# Patient Record
Sex: MALE | Race: BLACK OR AFRICAN AMERICAN | NOT HISPANIC OR LATINO
[De-identification: names, ages, dates, MRNs, and addresses within clinical notes are randomized per-mention and may not be internally consistent; named-entity substitution may affect disease eponyms.]

---
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
## 2021-03-10 ENCOUNTER — TRANSCRIPTION ENCOUNTER (OUTPATIENT)
Age: 21
End: 2021-03-10

## 2021-03-15 ENCOUNTER — TRANSCRIPTION ENCOUNTER (OUTPATIENT)
Age: 21
End: 2021-03-15

## 2021-03-20 ENCOUNTER — EMERGENCY (EMERGENCY)
Facility: HOSPITAL | Age: 21
LOS: 0 days | Discharge: HOME | End: 2021-03-20
Payer: OTHER MISCELLANEOUS

## 2021-03-20 VITALS
RESPIRATION RATE: 19 BRPM | SYSTOLIC BLOOD PRESSURE: 125 MMHG | OXYGEN SATURATION: 98 % | DIASTOLIC BLOOD PRESSURE: 81 MMHG | TEMPERATURE: 98 F | WEIGHT: 115.08 LBS | HEART RATE: 100 BPM | HEIGHT: 63 IN

## 2021-03-20 DIAGNOSIS — S61.212A LACERATION WITHOUT FOREIGN BODY OF RIGHT MIDDLE FINGER WITHOUT DAMAGE TO NAIL, INITIAL ENCOUNTER: ICD-10-CM
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
## 2021-03-20 PROCEDURE — 12001 RPR S/N/AX/GEN/TRNK 2.5CM/<: CPT

## 2021-03-20 PROCEDURE — 99283 EMERGENCY DEPT VISIT LOW MDM: CPT | Mod: 25

## 2021-03-20 PROCEDURE — 73120 X-RAY EXAM OF HAND: CPT | Mod: 26,RT

## 2021-03-20 RX ORDER — LIDOCAINE HCL 20 MG/ML
5 VIAL (ML) INJECTION ONCE
Refills: 0 | Status: COMPLETED | OUTPATIENT
Start: 2021-03-20 | End: 2021-03-20

## 2021-03-20 RX ORDER — IBUPROFEN 200 MG
600 TABLET ORAL ONCE
Refills: 0 | Status: COMPLETED | OUTPATIENT
Start: 2021-03-20 | End: 2021-03-20

## 2021-03-20 RX ORDER — BACITRACIN ZINC 500 UNIT/G
1 OINTMENT IN PACKET (EA) TOPICAL ONCE
Refills: 0 | Status: COMPLETED | OUTPATIENT
Start: 2021-03-20 | End: 2021-03-20

## 2021-03-20 RX ORDER — IBUPROFEN 200 MG
2 TABLET ORAL
Qty: 42 | Refills: 0
Start: 2021-03-20 | End: 2021-03-26

## 2021-03-20 RX ADMIN — Medication 450 MILLIGRAM(S): at 21:08

## 2021-03-20 RX ADMIN — Medication 5 MILLILITER(S): at 23:06

## 2021-03-20 RX ADMIN — Medication 1 APPLICATION(S): at 23:06

## 2021-03-20 RX ADMIN — Medication 600 MILLIGRAM(S): at 21:08

## 2021-03-20 NOTE — ED PROVIDER NOTE - CLINICAL SUMMARY MEDICAL DECISION MAKING FREE TEXT BOX
19 y/o male with no PMhx no t on meds presented to the ED with complaint of laceration 3rd right finger x today    imp right middle finger laceration     plan   motrin   Dx finger   lac repair   bacitracin   wound clean with sterile water and betadine   clean gauze   lidocaine w/o epi   patient education   return to the ED in 3 days for wound check   return in 7 days for suture removal

## 2021-03-20 NOTE — ED PROVIDER NOTE - NSFOLLOWUPCLINICS_GEN_ALL_ED_FT
[facility] - Primary Care  Primary Care  [address] [place] [address]  Phone: [phone]  Fax:   Follow Up Time:

## 2021-03-20 NOTE — ED PROVIDER NOTE - NSFOLLOWUPINSTRUCTIONS_ED_ALL_ED_FT
please return to the ED in 3 days for wound check on 3/23/21  please return to the ED in 7 days for suture removal on 3/27/21  patient instructed to please schedule to follow up with 7 days with your primary care doctor for laceration of finger or schedule to see primary care doctor at     [facility] - Primary Care  Primary Care  [address] [place] [address]  Phone: [phone]    Please take motrin 400 mg with meals every 8 hours as needed for pain   please take clindamycin 450 mg 3 times daily for 7 days     please return to the ED for worst symptoms chest pain, short of breath, fever, chills, cough, hemoptysis, numbness, weakness, drooling, blurry vision, headache, dizziness, bleeding, dysuria, frequency, urgency, rash, lesion, fall, injury, trauma, paresthesia, swelling, pain, discoloration

## 2021-03-20 NOTE — ED PROVIDER NOTE - PATIENT PORTAL LINK FT
You can access the FollowMyHealth Patient Portal offered by [facility] by registering at the following website: [email or web address]. By joining [employer]’s FollowMyHealth portal, you will also be able to view your health information using other applications (apps) compatible with our system.

## 2021-03-20 NOTE — ED PROVIDER NOTE - MUSCULOSKELETAL MINIMAL EXAM
1 cm horizontal laceration right middle phalange, no signs of infection, ttp around the area, minimal bleeding, FROM, no sensory deficit, distal pulse intact, cap refill < 2 sec, strength 5/5

## 2021-03-20 NOTE — ED PROVIDER NOTE - OBJECTIVE STATEMENT
21 y/o male with no PMhx no t on meds presented to the ED with complaint of laceration 3rd right finger x today while washing a knife. admit ot intermittent throbbing pain no radiation, worst with movement denies taking motrin or tylenol. denies lost of motion, discharge, numbness, decrease sensory, discoloration. patient admit to having tetanus shot this year.

## 2021-03-20 NOTE — ED PROCEDURE NOTE - PROCEDURE ADDITIONAL DETAILS
motrin   clinda  bacitracin   clean gauze   return in 3 days for wound check   return in 7 days for suture removal

## 2021-03-20 NOTE — ED PROVIDER NOTE - PROGRESS NOTE DETAILS
AAOx 3 non toxic looking afebrile sitting in bed in NAD, breathing comfortably. pain meds given. pain controlled. clinda given for laceration. Dx right hand done no fx visualized. wound washed with sterile water and betadine, digital block with 1 % lido w/o epi, lac repair done with 4.0, 4 simple interrupted sutures placed, bacitracin applied, clean gauze applies. educated to keep wound clean to avoid infection. strict precaution to return to ED given

## 2021-09-05 NOTE — ED PROVIDER NOTE - NS ED MD DISPO DISCHARGE CCDA
AAOx4, pleasant. Resting in bed, finished AM dose of IV antibiotic. C/o 0/10 pain, declining intervention at this time. -N/V. -N/T. Denies new onset of chest pain/SOB. +BS in all 4 quadrants, last BM yesterday per pt. Room air, satting > 90%. POC discussed, denies further needs at this time. Call light within reach & hourly rounding in place.    Patient/Caregiver provided printed discharge information.

## 2023-12-24 ENCOUNTER — EMERGENCY (EMERGENCY)
Facility: HOSPITAL | Age: 23
LOS: 0 days | Discharge: HOME | End: 2023-12-24
Attending: EMERGENCY MEDICINE
Payer: COMMERCIAL

## 2023-12-24 VITALS
RESPIRATION RATE: 19 BRPM | DIASTOLIC BLOOD PRESSURE: 71 MMHG | HEART RATE: 87 BPM | OXYGEN SATURATION: 99 % | HEIGHT: 63 IN | TEMPERATURE: 98 F | SYSTOLIC BLOOD PRESSURE: 107 MMHG | WEIGHT: 113.1 LBS

## 2023-12-24 VITALS
HEART RATE: 72 BPM | DIASTOLIC BLOOD PRESSURE: 77 MMHG | TEMPERATURE: 98 F | RESPIRATION RATE: 18 BRPM | SYSTOLIC BLOOD PRESSURE: 118 MMHG | OXYGEN SATURATION: 98 %
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
## 2023-12-24 DIAGNOSIS — M79.652 PAIN IN LEFT THIGH: ICD-10-CM

## 2023-12-24 PROCEDURE — 99283 EMERGENCY DEPT VISIT LOW MDM: CPT
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
## 2023-12-24 NOTE — ED PROVIDER NOTE - PATIENT PORTAL LINK FT
You can access the FollowMyHealth Patient Portal offered by [facility] by registering at the following website: [email or web address]. By joining "[employer]"’s FollowMyHealth portal, you will also be able to view your health information using other applications (apps) compatible with our system. You can access the FollowMyHealth Patient Portal offered by [facility] by registering at the following website: [email or web address]. By joining [employer]’s FollowMyHealth portal, you will also be able to view your health information using other applications (apps) compatible with our system.

## 2023-12-24 NOTE — ED ADULT NURSE NOTE - NSFALLUNIVINTERV_ED_ALL_ED
Bed/Stretcher in lowest position, wheels locked, appropriate side rails in place/Call bell, personal items and telephone in reach/Instruct patient to call for assistance before getting out of bed/chair/stretcher/Non-slip footwear applied when patient is off stretcher/[place] to call system/Physically safe environment - no spills, clutter or unnecessary equipment/Purposeful proactive rounding/Room/bathroom lighting operational, light cord in reach Bed/Stretcher in lowest position, wheels locked, appropriate side rails in place/Call bell, personal items and telephone in reach/Instruct patient to call for assistance before getting out of bed/chair/stretcher/Non-slip footwear applied when patient is off stretcher/[place] to call system/Physically safe environment - no spills, clutter or unnecessary equipment/Purposeful proactive rounding/Room/bathroom lighting operational, light cord in reach
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
## 2023-12-24 NOTE — ED PROVIDER NOTE - CLINICAL SUMMARY MEDICAL DECISION MAKING FREE TEXT BOX
<-- Click to add NO pertinent Past Medical History
Ddx: MSK pain/ unlikely fracture given FROM  Plan: Pt declines xray or pain medication, just wanted to get checked out, ready for d/c.

## 2024-05-07 ENCOUNTER — EMERGENCY (EMERGENCY)
Facility: HOSPITAL | Age: 24
LOS: 0 days | Discharge: HOME | End: 2024-05-08
Attending: STUDENT IN AN ORGANIZED HEALTH CARE EDUCATION/TRAINING PROGRAM
Payer: COMMERCIAL

## 2024-05-07 VITALS
TEMPERATURE: 99 F | HEIGHT: 62 IN | DIASTOLIC BLOOD PRESSURE: 70 MMHG | SYSTOLIC BLOOD PRESSURE: 116 MMHG | WEIGHT: 106.04 LBS | HEART RATE: 81 BPM | RESPIRATION RATE: 16 BRPM | OXYGEN SATURATION: 99 %

## 2024-05-07 VITALS
HEART RATE: 83 BPM | TEMPERATURE: 98 F | RESPIRATION RATE: 16 BRPM | DIASTOLIC BLOOD PRESSURE: 62 MMHG | OXYGEN SATURATION: 99 % | SYSTOLIC BLOOD PRESSURE: 100 MMHG
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
## 2024-05-07 DIAGNOSIS — M25.562 PAIN IN LEFT KNEE: ICD-10-CM

## 2024-05-07 PROCEDURE — 99284 EMERGENCY DEPT VISIT MOD MDM: CPT

## 2024-05-07 PROCEDURE — 73564 X-RAY EXAM KNEE 4 OR MORE: CPT | Mod: 26,LT

## 2024-05-07 RX ORDER — IBUPROFEN 200 MG
600 TABLET ORAL ONCE
Refills: 0 | Status: COMPLETED | OUTPATIENT
Start: 2024-05-07 | End: 2024-05-07

## 2024-05-07 RX ADMIN — Medication 600 MILLIGRAM(S): at 21:53
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
## 2024-05-07 NOTE — ED ADULT NURSE NOTE - NSFALLUNIVINTERV_ED_ALL_ED
Bed/Stretcher in lowest position, wheels locked, appropriate side rails in place/Call bell, personal items and telephone in reach/Instruct patient to call for assistance before getting out of bed/chair/stretcher/Non-slip footwear applied when patient is off stretcher/[place] to call system/Physically safe environment - no spills, clutter or unnecessary equipment/Purposeful proactive rounding/Room/bathroom lighting operational, light cord in reach

## 2024-05-07 NOTE — ED ADULT NURSE NOTE - CHPI ED NUR SYMPTOMS NEG
no acting out behaviors/no back pain/no bruising/no crying/no decreased eating/drinking/no laceration/no loss of consciousness/no neck tenderness

## 2024-05-08 NOTE — ED PROVIDER NOTE - NSFOLLOWUPINSTRUCTIONS_ED_ALL_ED_FT
can take ibuprofen over the counter for pain as needed.  followup with primary care doctor in next 7 days.   followup with orthopedic doctor in next 7 days if symptoms do not improve.     return if numbness/tingling, difficulty walking, swelling, fever or chills.

## 2024-05-08 NOTE — ED PROVIDER NOTE - PATIENT PORTAL LINK FT
You can access the FollowMyHealth Patient Portal offered by [facility] by registering at the following website: [email or web address]. By joining [employer]’s FollowMyHealth portal, you will also be able to view your health information using other applications (apps) compatible with our system.

## 2024-05-08 NOTE — ED PROVIDER NOTE - PROVIDER TOKENS
PROVIDER:[TOKEN:[[id number]],FOLLOWUP:[7-10 Days]],PROVIDER:[TOKEN:[[id number]],FOLLOWUP:[7-10 Days]]
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
## 2024-05-08 NOTE — ED PROVIDER NOTE - CARE PROVIDER_API CALL
Raymundo Ponce  Orthopaedic Surgery  [address], [address]  [place] [address]  Phone: [phone]  Fax: [phone]  Follow Up Time: 7-10 Days    Live Marte  Orthopaedic Surgery  [address] [place] [address]  Phone: [phone]  Fax: [phone]  Follow Up Time: 7-10 Days

## 2024-05-08 NOTE — ED PROVIDER NOTE - PHYSICAL EXAMINATION
General: Well appearing male in no acute distress  HEENT: Normocephalic, atraumatic. Moist mucous membranes. Oropharynx clear. No lymphadenopathy.  Eyes: No scleral icterus. EOMI. [place]UR[place]A.  Neck:. Soft and supple. Full ROM without pain. No midline tenderness  Cardiac: Regular rate and regular rhythm. No murmurs, rubs, gallops. Peripheral pulses 2+ and symmetric. No LE edema.  Resp: Lungs CTAB. Speaking in full sentences. No wheezes, rales or rhonchi.  Abd: Soft, non-tender, non-distended. No guarding or rebound. No scars, masses, or lesions.  Back: Spine midline and non-tender. No CVA tenderness.    Skin: No rashes, abrasions, or lacerations.  Neuro: AO x 3. Moves all extremities symmetrically. Motor strength and sensation grossly intact.  MSK: full ROM of L knee, compartment LLE are soft, DP2+, no significant tenderness of L knee.

## 2024-05-08 NOTE — ED PROVIDER NOTE - CARE PROVIDERS DIRECT ADDRESSES
[email or web address]

## 2024-10-06 NOTE — ED ADULT NURSE NOTE - DOES PATIENT HAVE ADVANCE DIRECTIVE
Return to the ED if with worsening or new symptoms.  Follow up with PMD in 2-3 days.    Herpangina, Pediatric  Herpangina is an illness in which sores form inside the mouth and throat. It is more likely to develop in young children ages 3 to 10. It occurs most often during the summer and fall.    What are the causes?  This condition is caused by a virus called the coxsackievirus. A child can get the virus by coming into contact with:  Saliva or discharge from the nose or throat of an infected person.  Stool (feces) of an infected person.  What are the signs or symptoms?  Symptoms of this condition include:  Blister-like sores in the back of the throat and inside the mouth. These may also appear:  Around the outside of the mouth.  On the palms of the hands and soles of the feet.  Fever.  Sore throat or pain with swallowing.  Vomiting.  Headache or body aches.  Irritability.  Poor appetite.  Tiredness (fatigue).  Weakness.  Symptoms usually develop 3–6 days after exposure to the virus.    How is this diagnosed?  This condition is diagnosed with a physical exam.    How is this treated?  This condition usually goes away on its own within 1 week. Medicines may be given to ease symptoms and reduce fever.    Follow these instructions at home:  Medicines    Give over-the-counter and prescription medicines only as told by your child's health care provider.  Do not give your child aspirin because of the association with Reye's syndrome.  Do not use products that contain benzocaine (including numbing gels) to treat mouth pain in children who are younger than 2 years. These products may cause a rare but serious blood condition.  Eating and drinking    Avoid giving your child foods and drinks that are salty, spicy, hard, or acidic, such as orange juice. They may make the sores more painful.  Have your child eat and drink soft, bland, and cold foods and beverages that are easier to swallow.  Make sure that your child is getting enough to drink.  Have your child drink enough fluid to keep his or her urine pale yellow.  If your child is not eating or drinking, weigh him or her every day. If your child is losing weight rapidly, he or she may be dehydrated.  General instructions    Have your child rest at home.  If your child is old enough to rinse and spit, have your child rinse his or her mouth with a mixture of salt and water 3–4 times a day or as needed. To make salt water, completely dissolve ½–1 tsp (3–6 g) of salt in 1 cup (237 mL) of warm water.  Wash your hands and your child's hands with soap and water often for at least 20 seconds. If soap and water are not available, use alcohol-based hand .  During the illness:  Cover your child's mouth and nose when he or she is coughing or sneezing.  Do not allow your child to kiss anyone.  Do not allow your child to share food, drinks, or utensils with anyone.  Keep all follow-up visits. This is important.  Contact a health care provider if:  Your child's symptoms do not go away in 1 week.  Your child's fever does not go away after 4–5 days.  Your child has symptoms of mild to moderate dehydration. These include:  Dry lips.  Dry mouth.  Sunken eyes.  Get help right away if:  Your child's pain is not relieved by medicine.  Your child who is younger than 3 months has a temperature of 100.4°F (38°C) or higher.  Your child has symptoms of severe dehydration. These include:  Cold hands and feet.  Rapid breathing.  Confusion.  Decreased tears or sunken eyes.  Decreased urination. This means urinating only very small amounts or fewer than 3 times in a 24-hour period.  Urine that is very dark.  Dry mouth, tongue, or lips.  Decreased activity or being very sleepy.  Your child's fingertips taking longer than 2 seconds to turn pink after a gentle squeeze.  These symptoms may represent a serious problem that is an emergency. Do not wait to see if the symptoms will go away. Get medical help right away. Call your local emergency services (911 in the [place]    Summary  Herpangina is an illness in which sores form inside the mouth and throat. This condition is caused by a virus.  Herpangina usually goes away on its own within 1 week.  Medicines may be given to ease symptoms and reduce fever.  Wash your hands and your child's hands with soap and water often for at least 20 seconds. If soap and water are not available, use alcohol-based hand .  Contact a health care provider if your child's symptoms do not go away in 1 week.  This information is not intended to replace advice given to you by your health care provider. Make sure you discuss any questions you have with your health care provider.
No